# Patient Record
(demographics unavailable — no encounter records)

---

## 2025-06-30 NOTE — PHYSICAL EXAM
[FreeTextEntry1] : Video exam Severe dysarthria limits his ability to communicate effectively. With the limits of the communication, there is no evidence of cognitive problems, reports symptoms coherently, attention is normal, language is normal, and he follows instructions accurately with no sign of cognitive dysfunction.. Excessive drooling noted motor exam limited, patient able to extend arms bilaterally, unable to walk at this time.

## 2025-06-30 NOTE — DISCUSSION/SUMMARY
[FreeTextEntry1] : Patient is an 83-year-old male with advanced progressive PLS, with further progression of the disease, and now almost complete paralysis, wheelchair bound, with some residual movements in the hands, but with complete paralysis of the legs, and with severe speech impairment, with residual unintelligible vocalization. He needs 24 hr help and assistance with all ADLs, and he can communicate only with yes/no nodding in response to questions.  Of note, he responded with head nodding that he wished to confirm his directives of no aggressive treatment and no hospitalization in case of emergency.   Plan  Continue current medications and supportive care At this time no specific neurological d Resume glycopirrolate for drooling  PT and speech can still be effective in mitigating further progression and worsening of function. Monitor for dysphagia with modified diet, including thickening liquids and dysphagia diet. Continue wound care

## 2025-06-30 NOTE — END OF VISIT
[Time Spent: ___ minutes] : I have spent [unfilled] minutes of time on the encounter which excludes teaching and separately reported services. [] : Fellow [FreeTextEntry3] : Fellow present and participated to visit, Patient's history reviewed with fellow, patient examined with fellow, assessment and plan discussed with fellow.

## 2025-06-30 NOTE — HISTORY OF PRESENT ILLNESS
[FreeTextEntry1] : Home Visit.  82 yo RH man (ambidextrous at birth), with clinical diagnosis of Primary Lateral Sclerosis, slowly progressing, and not responsive to medications. He moved to Assisted Facility in January 2022."[facility]" [place], [place].  History obtained by his aide, and through conversation with patient, who answer by nodding yes or no in response to specific questions.  The patient now requires full assistance for all ADLs, and he has 24 hour help. His motor function is now severely limited, with some residual motor activity in his hands, he can still feed himself when food is cut and presented to him, but he cannot transfer or perform any other pusposeful movement, and with some vocalizations, which are no longer intelligible.  Although he has. occasional episodes of dysphagia, he has not had choking episodes. No cramps or spasms, no pain. He has excessive saliva with drooling. Pressure ulcers have significantly improved, Sleep is preserved.  He has been tried on a number of medication, including Aminopyridine, which has not been helpful, and has been discontinued. Baclofen initially not tolerated, but when reintroduced at lower doses, has been mildly effective. The dose has been gradually increased, currently 15 mg AM (3 x 5 mg) and 20 mg qhs (4 x 5 mg)  Current medications baclofen 20 - 30  daily melatonin 25mg at bedtime  trazodone 100mg avodart 0.5mg one tab daily losartan 25mg  MiraLAX toprol XL 100mg uroxatral 10mg ER tab  ipratropium bromide fluticasone nasal spray

## 2025-06-30 NOTE — ASSESSMENT
[FreeTextEntry1] : 81 yo RH man (ambidextrous at birth), with clinical diagnosis of Primary Lateral Sclerosis, PLS, slowly progressing, and not responsive to medications. His first symptoms were in 2018, with slowing of movements and difficulty walking. He was initially diagnosed with Parkinson's at another institution, but on examination, he had evidence of PLS. The disease has steadily progressed since then. He moved to Assisted Facility in January 2022."[facility]" [place], [place], after he became unable to attend his own ADLs. Over the past few months he has had a signifcant worsening, motor abilities are now severely limited, unable to ambulate, with difficulty with transfers from in and out of bed. His speech is severely dysarthric, and he can communicate through a computer system through a tap application, and verbally only with 1-2 words which however are often dfifficult to understand, and that he has to repeat, with great effort and frustration. He continues to eat and drink, but he did have a chocking episode a few weeks ago, but he repoets that since then he has not had any difficlty. Sleep has been better on Trazodone 100mg and melatonin. He has a personal aide for a few hours in the morning, otherwise he relies on the genral help of the facility. He has difficulty doing PT, because of limitations of his movements.  A/p Advanced PSP, with very limited residual function. He has severe dysarthria, and he has had one episode of chocking a few weeks ago, although he reports his  swallowing has been ok since.   Discussed with patient possible evolution of the disease, and the possibility that in the near future he may develop swallowing problems, requiring diet and drink modification. Angel discussed long term possible need for a PEG, which the patient does not want to consider. Also discussed palliative and comfort care, and the need to have an updated living will and health care proxy. Continue current medication regimen, with baclofen and Trazodone.  More than 50 % of time was used to discuss treatment, therapeutic plan, and prognosis, and patient was counseled on lifestyle, coping, and physical and emotional wellbeing.